# Patient Record
Sex: MALE | Race: WHITE | NOT HISPANIC OR LATINO | Employment: FULL TIME | URBAN - METROPOLITAN AREA
[De-identification: names, ages, dates, MRNs, and addresses within clinical notes are randomized per-mention and may not be internally consistent; named-entity substitution may affect disease eponyms.]

---
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
## 2023-04-07 ENCOUNTER — HOSPITAL ENCOUNTER (EMERGENCY)
Facility: HOSPITAL | Age: 18
Discharge: HOME | End: 2023-04-07
Attending: EMERGENCY MEDICINE

## 2023-04-07 ENCOUNTER — PATIENT MESSAGE (OUTPATIENT)
Dept: FAMILY MEDICINE | Facility: CLINIC | Age: 18
End: 2023-04-07

## 2023-04-07 VITALS
WEIGHT: 190 LBS | DIASTOLIC BLOOD PRESSURE: 80 MMHG | BODY MASS INDEX: 25.73 KG/M2 | TEMPERATURE: 98 F | HEART RATE: 72 BPM | SYSTOLIC BLOOD PRESSURE: 134 MMHG | HEIGHT: 72 IN | RESPIRATION RATE: 14 BRPM | OXYGEN SATURATION: 99 %

## 2023-04-07 DIAGNOSIS — R11.2 NAUSEA AND VOMITING, UNSPECIFIED VOMITING TYPE: Primary | ICD-10-CM

## 2023-04-07 DIAGNOSIS — R10.11 RIGHT UPPER QUADRANT ABDOMINAL PAIN: ICD-10-CM

## 2023-04-07 DIAGNOSIS — K92.0 HEMATEMESIS: ICD-10-CM

## 2023-04-07 PROCEDURE — 71046 X-RAY EXAM CHEST 2 VIEWS: CPT | Mod: TC

## 2023-04-07 PROCEDURE — 71046 XR CHEST PA AND LATERAL: ICD-10-PCS | Mod: 26,,, | Performed by: RADIOLOGY

## 2023-04-07 PROCEDURE — 99283 EMERGENCY DEPT VISIT LOW MDM: CPT | Mod: 25

## 2023-04-07 PROCEDURE — 71046 X-RAY EXAM CHEST 2 VIEWS: CPT | Mod: 26,,, | Performed by: RADIOLOGY

## 2023-04-07 RX ORDER — ONDANSETRON 4 MG/1
4 TABLET, ORALLY DISINTEGRATING ORAL EVERY 6 HOURS PRN
Qty: 20 TABLET | Refills: 0 | Status: SHIPPED | OUTPATIENT
Start: 2023-04-07

## 2023-04-07 NOTE — ED PROVIDER NOTES
Encounter Date: 4/7/2023       History     Chief Complaint   Patient presents with    Emesis     17-year-old male, here from home with his mother for evaluation and treatment of vomiting hematemesis.  Patient states that he became nauseous late last night, and has vomited 3 times overnight.  Early this morning his grandmother gave him 2 Zofran tablets, and he is asymptomatic at this time.  He is concerned because with 1 episode of vomiting had some streaks of blood in the vomitus.  He states now the feels like his throat is tight.  Denies any difficulty swallowing or breathing.  Patient has had similar episodes vomiting in the past.  Ultrasound of the upper abdomen at that time revealed fatty infiltration of the liver but no other significant findings.  Patient states he was advised to follow a low carb and low sugar diet, cut out sodas, etc., and he states for quite some time the dietary change helped with his symptoms.  Last night was the 1st time he had vomited for quite some time.  He is currently not on any medications for nausea, no medications for GERD etc..  Patient does have a photograph on his phone of his vomitus, which does show  a small bit of blood mixed with his vomitus.  Patient denies any abdominal pain.  Denies any dark or bloody stools.  Patient has not seen any specialists for any of the symptoms.  He sees Dr. Dillon for primary care.    Review of patient's allergies indicates:  No Known Allergies  No past medical history on file.  No past surgical history on file.  Family History   Problem Relation Age of Onset    Anxiety disorder Mother     COPD Father     Emphysema Father     Depression Maternal Grandmother     Diabetes Maternal Grandmother     Diabetes Maternal Grandfather     Kidney disease Paternal Grandmother      Social History     Tobacco Use    Smoking status: Never    Smokeless tobacco: Never   Substance Use Topics    Alcohol use: Never    Drug use: Yes     Frequency: 3.0 times  per week     Types: Marijuana     Review of Systems   HENT: Negative.     Eyes: Negative.    Respiratory: Negative.     Cardiovascular: Negative.    Gastrointestinal:  Positive for nausea and vomiting. Negative for abdominal distention, abdominal pain, blood in stool, constipation and diarrhea.   Endocrine: Negative.    Genitourinary: Negative.    Musculoskeletal: Negative.    Allergic/Immunologic: Negative.    Neurological: Negative.    Hematological: Negative.    Psychiatric/Behavioral: Negative.       Physical Exam     Initial Vitals [04/07/23 0656]   BP Pulse Resp Temp SpO2   137/84 70 16 98.2 °F (36.8 °C) 99 %      MAP       --         Physical Exam    Nursing note and vitals reviewed.  Constitutional: He appears well-developed and well-nourished. He is not diaphoretic. No distress.   HENT:   Head: Normocephalic and atraumatic.   Nose: Nose normal.   Mouth/Throat: Oropharynx is clear and moist. No oropharyngeal exudate.   Eyes: Conjunctivae and EOM are normal. Pupils are equal, round, and reactive to light. No scleral icterus.   Neck: Neck supple. No JVD present.   Normal range of motion.  Cardiovascular:  Normal rate, regular rhythm, normal heart sounds and intact distal pulses.           No murmur heard.  Pulmonary/Chest: Breath sounds normal. No stridor. No respiratory distress. He has no wheezes.   Abdominal: Abdomen is soft. Bowel sounds are normal. He exhibits no distension and no mass. There is no abdominal tenderness. There is no rebound and no guarding.   Musculoskeletal:         General: No tenderness or edema. Normal range of motion.      Cervical back: Normal range of motion and neck supple.     Neurological: He is alert and oriented to person, place, and time. He has normal strength. No cranial nerve deficit or sensory deficit. GCS score is 15. GCS eye subscore is 4. GCS verbal subscore is 5. GCS motor subscore is 6.   Skin: Skin is warm and dry. Capillary refill takes less than 2 seconds. No rash  noted. No erythema.   Psychiatric: He has a normal mood and affect. His behavior is normal.       ED Course   Procedures  Labs Reviewed - No data to display       Imaging Results              X-Ray Chest PA And Lateral (Final result)  Result time 04/07/23 08:39:10      Final result by Holger De La Cruz MD (04/07/23 08:39:10)                   Impression:      No evidence of an acute radiographic abnormality.      Electronically signed by: Holger De La Cruz  Date:    04/07/2023  Time:    08:39               Narrative:    EXAMINATION:  XR CHEST PA AND LATERAL    CLINICAL HISTORY:  Hematemesis    TECHNIQUE:  PA and lateral views of the chest were performed.    COMPARISON:  None.    FINDINGS:  Cardiomediastinal silhouette is within normal limits.   Lungs are symmetrically expanded and appear grossly clear.  No definite consolidation, pleural effusion, or pneumothorax. No acute osseous abnormality.                                    X-Rays:   Independently Interpreted Readings:   Other Readings:  Chest x-ray personally reviewed by me shows clear lungs bilaterally.  No free air beneath the diaphragm, normal cardiac silhouette, normal mediastinum, normal skeletal structures  Medications - No data to display  Medical Decision Making:   Differential Diagnosis:   GERD, gastritis, pancreatitis, cholecystitis, bowel obstruction, peptic ulcer, Mag-Edwards tear, Boerhaave's, etc.  ED Management:  Patient had artery taken Zofran prior to coming here and states that his symptoms had dissipated before arrival.  He is had no further episodes while here.  No abdominal pain.  An x-ray showed no mediastinal widening, clear lungs, normal cardiac silhouette, no free air beneath the diaphragm.  I believe patient is safe for discharge home.  Will prescribe Zofran to use as needed, and I recommended daily Pepcid until he sees his primary care provider early next week.  He will return here for any worsening signs or symptoms over the weekend.  He will  continue his diet.                        Clinical Impression:   Final diagnoses:  [K92.0] Hematemesis  [R11.2] Nausea and vomiting, unspecified vomiting type (Primary)        ED Disposition Condition    Discharge Stable          ED Prescriptions       Medication Sig Dispense Start Date End Date Auth. Provider    ondansetron (ZOFRAN-ODT) 4 MG TbDL Take 1 tablet (4 mg total) by mouth every 6 (six) hours as needed. 20 tablet 4/7/2023 -- Eric Sanchez MD          Follow-up Information       Follow up With Specialties Details Why Contact Info    Junaid Dillon MD Family Medicine Call today  [address] [facility] [address] [phone]      [facility] Emergency Dept Emergency Medicine  As needed, If symptoms worsen [address] [facility] [address] [phone]             Eric Sanchez MD  04/07/23 0906

## 2023-04-07 NOTE — ED TRIAGE NOTES
Pt presents to the er with c/o vomiting since last night. Pt states he noted some blood in his vomit. Pt denies abd pain/diarrhea

## 2023-04-07 NOTE — Clinical Note
"John Velasquezdamien Damico was seen and treated in our emergency department on 4/7/2023.  He may return to work on 04/08/2023.       If you have any questions or concerns, please don't hesitate to call.      Kwesi MARSH RN    "

## 2023-04-07 NOTE — DISCHARGE INSTRUCTIONS
As we discussed, continue your diet, avoid any spicy or greasy foods, take a daily acid reduce her such as Pepcid, and take Zofran for any nausea.  Follow-up with Dr. Dillon, who may suggest follow-up with a gastroenterologist or surgeon for endoscopy.  Return here for any worsening signs or symptoms.

## 2023-04-18 ENCOUNTER — OFFICE VISIT (OUTPATIENT)
Dept: FAMILY MEDICINE | Facility: CLINIC | Age: 18
End: 2023-04-18

## 2023-04-18 VITALS
BODY MASS INDEX: 25.14 KG/M2 | HEIGHT: 72 IN | WEIGHT: 185.63 LBS | OXYGEN SATURATION: 98 % | RESPIRATION RATE: 15 BRPM | SYSTOLIC BLOOD PRESSURE: 136 MMHG | HEART RATE: 71 BPM | DIASTOLIC BLOOD PRESSURE: 78 MMHG

## 2023-04-18 DIAGNOSIS — F12.90 MARIJUANA USE: ICD-10-CM

## 2023-04-18 DIAGNOSIS — R11.10 VOMITING, UNSPECIFIED VOMITING TYPE, UNSPECIFIED WHETHER NAUSEA PRESENT: ICD-10-CM

## 2023-04-18 DIAGNOSIS — K21.9 GASTROESOPHAGEAL REFLUX DISEASE, UNSPECIFIED WHETHER ESOPHAGITIS PRESENT: Primary | ICD-10-CM

## 2023-04-18 DIAGNOSIS — F41.9 ANXIETY: ICD-10-CM

## 2023-04-18 DIAGNOSIS — R63.4 WEIGHT LOSS: ICD-10-CM

## 2023-04-18 PROCEDURE — 99214 OFFICE O/P EST MOD 30 MIN: CPT | Mod: PBBFAC,PN | Performed by: STUDENT IN AN ORGANIZED HEALTH CARE EDUCATION/TRAINING PROGRAM

## 2023-04-18 PROCEDURE — 99214 PR OFFICE/OUTPT VISIT, EST, LEVL IV, 30-39 MIN: ICD-10-PCS | Mod: S$PBB,,, | Performed by: STUDENT IN AN ORGANIZED HEALTH CARE EDUCATION/TRAINING PROGRAM

## 2023-04-18 PROCEDURE — 99214 OFFICE O/P EST MOD 30 MIN: CPT | Mod: S$PBB,,, | Performed by: STUDENT IN AN ORGANIZED HEALTH CARE EDUCATION/TRAINING PROGRAM

## 2023-04-18 PROCEDURE — 99999 PR PBB SHADOW E&M-EST. PATIENT-LVL IV: ICD-10-PCS | Mod: PBBFAC,,, | Performed by: STUDENT IN AN ORGANIZED HEALTH CARE EDUCATION/TRAINING PROGRAM

## 2023-04-18 PROCEDURE — 99999 PR PBB SHADOW E&M-EST. PATIENT-LVL IV: CPT | Mod: PBBFAC,,, | Performed by: STUDENT IN AN ORGANIZED HEALTH CARE EDUCATION/TRAINING PROGRAM

## 2023-04-18 RX ORDER — BUSPIRONE HYDROCHLORIDE 5 MG/1
5 TABLET ORAL 3 TIMES DAILY PRN
Qty: 90 TABLET | Refills: 0 | Status: SHIPPED | OUTPATIENT
Start: 2023-04-18 | End: 2023-06-01 | Stop reason: SDUPTHER

## 2023-04-18 RX ORDER — PANTOPRAZOLE SODIUM 40 MG/1
40 TABLET, DELAYED RELEASE ORAL DAILY
Qty: 30 TABLET | Refills: 11 | Status: SHIPPED | OUTPATIENT
Start: 2023-04-18 | End: 2024-04-17

## 2023-04-18 RX ORDER — SERTRALINE HYDROCHLORIDE 25 MG/1
TABLET, FILM COATED ORAL
Qty: 49 TABLET | Refills: 0 | Status: SHIPPED | OUTPATIENT
Start: 2023-04-18 | End: 2023-05-10 | Stop reason: SDUPTHER

## 2023-04-18 NOTE — Clinical Note
I am getting this patient a 4 week followup with you.  I started him on anxiety treatment in case that is playing a part in his vomiting.  He has lost over 50 lbs since July of last year.  I also ordered GI evaluation. Just wanted to give you a heads up for when he follows up.  Have a great day- Alley

## 2023-04-18 NOTE — PATIENT INSTRUCTIONS

## 2023-04-18 NOTE — PROGRESS NOTES
Subjective:       Patient ID: John Damico is a 17 y.o. male.    Chief Complaint: Emesis    ER followup    17-year-old male, here from home with his mother for evaluation and treatment of vomiting hematemesis.  Patient states that he became nauseous late last night, and has vomited 3 times overnight.  Early this morning his grandmother gave him 2 Zofran tablets, and he is asymptomatic at this time.  He is concerned because with 1 episode of vomiting had some streaks of blood in the vomitus.  He states now the feels like his throat is tight.  Denies any difficulty swallowing or breathing.  Patient has had similar episodes vomiting in the past.  Ultrasound of the upper abdomen at that time revealed fatty infiltration of the liver but no other significant findings.  Patient states he was advised to follow a low carb and low sugar diet, cut out sodas, etc., and he states for quite some time the dietary change helped with his symptoms.  Last night was the 1st time he had vomited for quite some time.  He is currently not on any medications for nausea, no medications for GERD etc..  Patient does have a photograph on his phone of his vomitus, which does show  a small bit of blood mixed with his vomitus.  Patient denies any abdominal pain.  Denies any dark or bloody stools.  Patient has not seen any specialists for any of the symptoms.  He sees Dr. Dillon for primary care.    He reports that he has been vomiting every morning over a year (started around July)  He has extreme anxiety, nausea and will vomit in the morning  No specific food associations but notes it is worse if he eats after 8pm  He does have some reflux symptoms. No water brash.  He has had episodes of hematemesis and severe heaving with his vomiting episodes.  He denies dysphagia but says he sometimes has undigested food come back up.  He denies fever.  Some mild abdominal pain after vomiting.  He has lost weight- he reports over 50 lbs in the last  year  He is a marijuana smoker but does not note improvement or worsening with this.  He is worried this is due to his anxiety.  He reports daily constant anxiety symptoms.  He has a family history of anxiety in his mom/dad/grandmother.  He denies depression symptoms    Wt Readings from Last 10 Encounters:  04/18/23 : 84.2 kg (185 lb 9.6 oz) (86 %, Z= 1.06)*  04/07/23 : 86.2 kg (190 lb) (88 %, Z= 1.19)*  10/02/22 : 103 kg (227 lb) (98 %, Z= 2.04)*  09/27/22 : 103.1 kg (227 lb 6.4 oz) (98 %, Z= 2.05)*  08/27/22 : 108.9 kg (240 lb) (99 %, Z= 2.27)*  07/15/22 : 113.4 kg (250 lb) (>99 %, Z= 2.43)*  04/08/22 : 111.9 kg (246 lb 12.8 oz) (>99 %, Z= 2.39)*  09/27/21 : 113.4 kg (250 lb) (>99 %, Z= 2.48)*  01/02/20 : 92.5 kg (204 lb) (98 %, Z= 2.01)*  12/30/19 : 93.7 kg (206 lb 9.1 oz) (98 %, Z= 2.06)*    * Growth percentiles are based on [facility] (Boys, 2-20 Years) data.      Review of Systems   Constitutional:  Positive for fatigue and unexpected weight change. Negative for activity change, appetite change, chills and fever.   HENT:  Negative for trouble swallowing.    Respiratory:  Negative for choking and shortness of breath.    Cardiovascular:  Negative for chest pain and leg swelling.   Gastrointestinal:  Positive for abdominal pain, nausea, vomiting and reflux. Negative for blood in stool, change in bowel habit, constipation, diarrhea and change in bowel habit.   Endocrine: Negative for cold intolerance, heat intolerance, polydipsia and polyuria.   Genitourinary:  Negative for decreased urine volume, difficulty urinating, dysuria, frequency, hematuria and urgency.   Musculoskeletal:  Negative for arthralgias, back pain and gait problem.   Integumentary:  Negative for rash and wound.   Neurological:  Negative for dizziness, weakness and headaches.   Psychiatric/Behavioral:  Negative for dysphoric mood and sleep disturbance. The patient is nervous/anxious.        Objective:      Physical Exam  Constitutional:       General:  He is not in acute distress.     Appearance: Normal appearance. He is not ill-appearing.   Eyes:      Conjunctiva/sclera: Conjunctivae normal.   Cardiovascular:      Rate and Rhythm: Normal rate and regular rhythm.      Heart sounds: Normal heart sounds. No murmur heard.  Pulmonary:      Effort: Pulmonary effort is normal. No respiratory distress.      Breath sounds: Normal breath sounds.   Abdominal:      Palpations: Abdomen is soft.      Tenderness: There is no abdominal tenderness.   Musculoskeletal:      Right lower leg: No edema.      Left lower leg: No edema.   Skin:     General: Skin is warm and dry.   Neurological:      Mental Status: He is alert. Mental status is at baseline.      Gait: Gait normal.   Psychiatric:         Mood and Affect: Mood normal.         Behavior: Behavior normal.         Thought Content: Thought content normal.         Judgment: Judgment normal.       Assessment:       1. Gastroesophageal reflux disease, unspecified whether esophagitis present    2. Vomiting, unspecified vomiting type, unspecified whether nausea present    3. Weight loss    4. Marijuana use    5. Anxiety        Plan:       Problem List Items Addressed This Visit    None  Visit Diagnoses       Gastroesophageal reflux disease, unspecified whether esophagitis present    -  Primary    reports occasional symptoms.  Vomiting is worse if eating after 8pm and usually in the morning. start ppi and GI eval    Relevant Medications    pantoprazole (PROTONIX) 40 MG tablet    Other Relevant Orders    Ambulatory referral/consult to Gastroenterology    Vomiting, unspecified vomiting type, unspecified whether nausea present        GI eval, avoid marijuana use. Treat possible gerd.     Relevant Orders    Ambulatory referral/consult to Gastroenterology    H.Pylori Antibody IgG    Weight loss        Over 60 lbs.  Labs ordered, GI eval- will need egd I suspect    Relevant Orders    Ambulatory referral/consult to Gastroenterology    CBC  Auto Differential    Comprehensive Metabolic Panel    HIV 1/2 Ag/Ab (4th Gen)    Hepatitis C Antibody    Hemoglobin A1C    TSH    Marijuana use        avoid use    Anxiety        Start treatment for anxiety in case this is playing a part. close followup    Relevant Medications    sertraline (ZOLOFT) 25 MG tablet    busPIRone (BUSPAR) 5 MG Tab            Keep close followup   Complex situation with risk to his life as he is rapidly loosing weight for unknown cause.

## 2023-04-22 ENCOUNTER — PATIENT MESSAGE (OUTPATIENT)
Dept: FAMILY MEDICINE | Facility: CLINIC | Age: 18
End: 2023-04-22

## 2023-04-27 ENCOUNTER — LAB VISIT (OUTPATIENT)
Dept: LAB | Facility: HOSPITAL | Age: 18
End: 2023-04-27
Attending: STUDENT IN AN ORGANIZED HEALTH CARE EDUCATION/TRAINING PROGRAM

## 2023-04-27 DIAGNOSIS — R63.4 WEIGHT LOSS: ICD-10-CM

## 2023-04-27 DIAGNOSIS — R11.10 VOMITING, UNSPECIFIED VOMITING TYPE, UNSPECIFIED WHETHER NAUSEA PRESENT: ICD-10-CM

## 2023-04-27 LAB
ALBUMIN SERPL BCP-MCNC: 4.6 G/DL (ref 3.5–5.2)
ALP SERPL-CCNC: 68 U/L (ref 55–135)
ALT SERPL W/O P-5'-P-CCNC: 26 U/L (ref 10–44)
ANION GAP SERPL CALC-SCNC: 9 MMOL/L (ref 8–16)
AST SERPL-CCNC: 20 U/L (ref 10–40)
BASOPHILS # BLD AUTO: 0.04 K/UL (ref 0–0.2)
BASOPHILS NFR BLD: 0.7 % (ref 0–1.9)
BILIRUB SERPL-MCNC: 0.6 MG/DL (ref 0.1–1)
BUN SERPL-MCNC: 10 MG/DL (ref 6–20)
CALCIUM SERPL-MCNC: 9.9 MG/DL (ref 8.7–10.5)
CHLORIDE SERPL-SCNC: 104 MMOL/L (ref 95–110)
CO2 SERPL-SCNC: 27 MMOL/L (ref 23–29)
CREAT SERPL-MCNC: 1 MG/DL (ref 0.5–1.4)
DIFFERENTIAL METHOD: NORMAL
EOSINOPHIL # BLD AUTO: 0.3 K/UL (ref 0–0.5)
EOSINOPHIL NFR BLD: 5 % (ref 0–8)
ERYTHROCYTE [DISTWIDTH] IN BLOOD BY AUTOMATED COUNT: 13.1 % (ref 11.5–14.5)
EST. GFR  (NO RACE VARIABLE): >60 ML/MIN/1.73 M^2
ESTIMATED AVG GLUCOSE: 97 MG/DL (ref 68–131)
GLUCOSE SERPL-MCNC: 90 MG/DL (ref 70–110)
HBA1C MFR BLD: 5 % (ref 4–5.6)
HCT VFR BLD AUTO: 45.8 % (ref 40–54)
HCV AB SERPL QL IA: NORMAL
HGB BLD-MCNC: 15.7 G/DL (ref 14–18)
HIV 1+2 AB+HIV1 P24 AG SERPL QL IA: NORMAL
IMM GRANULOCYTES # BLD AUTO: 0.01 K/UL (ref 0–0.04)
IMM GRANULOCYTES NFR BLD AUTO: 0.2 % (ref 0–0.5)
LYMPHOCYTES # BLD AUTO: 2.4 K/UL (ref 1–4.8)
LYMPHOCYTES NFR BLD: 43.4 % (ref 18–48)
MCH RBC QN AUTO: 28.8 PG (ref 27–31)
MCHC RBC AUTO-ENTMCNC: 34.3 G/DL (ref 32–36)
MCV RBC AUTO: 84 FL (ref 82–98)
MONOCYTES # BLD AUTO: 0.3 K/UL (ref 0.3–1)
MONOCYTES NFR BLD: 5.4 % (ref 4–15)
NEUTROPHILS # BLD AUTO: 2.5 K/UL (ref 1.8–7.7)
NEUTROPHILS NFR BLD: 45.3 % (ref 38–73)
NRBC BLD-RTO: 0 /100 WBC
PLATELET # BLD AUTO: 359 K/UL (ref 150–450)
PMV BLD AUTO: 11.2 FL (ref 9.2–12.9)
POTASSIUM SERPL-SCNC: 3.9 MMOL/L (ref 3.5–5.1)
PROT SERPL-MCNC: 8 G/DL (ref 6–8.4)
RBC # BLD AUTO: 5.45 M/UL (ref 4.6–6.2)
SODIUM SERPL-SCNC: 140 MMOL/L (ref 136–145)
TSH SERPL DL<=0.005 MIU/L-ACNC: 1.63 UIU/ML (ref 0.4–4)
WBC # BLD AUTO: 5.6 K/UL (ref 3.9–12.7)

## 2023-04-27 PROCEDURE — 85025 COMPLETE CBC W/AUTO DIFF WBC: CPT | Performed by: STUDENT IN AN ORGANIZED HEALTH CARE EDUCATION/TRAINING PROGRAM

## 2023-04-27 PROCEDURE — 87389 HIV-1 AG W/HIV-1&-2 AB AG IA: CPT | Performed by: STUDENT IN AN ORGANIZED HEALTH CARE EDUCATION/TRAINING PROGRAM

## 2023-04-27 PROCEDURE — 84443 ASSAY THYROID STIM HORMONE: CPT | Performed by: STUDENT IN AN ORGANIZED HEALTH CARE EDUCATION/TRAINING PROGRAM

## 2023-04-27 PROCEDURE — 83036 HEMOGLOBIN GLYCOSYLATED A1C: CPT | Performed by: STUDENT IN AN ORGANIZED HEALTH CARE EDUCATION/TRAINING PROGRAM

## 2023-04-27 PROCEDURE — 80053 COMPREHEN METABOLIC PANEL: CPT | Performed by: STUDENT IN AN ORGANIZED HEALTH CARE EDUCATION/TRAINING PROGRAM

## 2023-04-27 PROCEDURE — 86677 HELICOBACTER PYLORI ANTIBODY: CPT | Performed by: STUDENT IN AN ORGANIZED HEALTH CARE EDUCATION/TRAINING PROGRAM

## 2023-04-27 PROCEDURE — 86803 HEPATITIS C AB TEST: CPT | Performed by: STUDENT IN AN ORGANIZED HEALTH CARE EDUCATION/TRAINING PROGRAM

## 2023-04-27 PROCEDURE — 36415 COLL VENOUS BLD VENIPUNCTURE: CPT | Performed by: STUDENT IN AN ORGANIZED HEALTH CARE EDUCATION/TRAINING PROGRAM

## 2023-04-28 LAB — H PYLORI IGG SERPL QL IA: NEGATIVE

## 2023-05-10 DIAGNOSIS — F41.9 ANXIETY: ICD-10-CM

## 2023-05-10 RX ORDER — SERTRALINE HYDROCHLORIDE 100 MG/1
100 TABLET, FILM COATED ORAL DAILY
Qty: 30 TABLET | Refills: 2 | Status: SHIPPED | OUTPATIENT
Start: 2023-05-10 | End: 2023-05-17

## 2023-05-17 ENCOUNTER — OFFICE VISIT (OUTPATIENT)
Dept: FAMILY MEDICINE | Facility: CLINIC | Age: 18
End: 2023-05-17

## 2023-05-17 VITALS
WEIGHT: 183.38 LBS | SYSTOLIC BLOOD PRESSURE: 116 MMHG | HEART RATE: 90 BPM | DIASTOLIC BLOOD PRESSURE: 64 MMHG | TEMPERATURE: 98 F | RESPIRATION RATE: 14 BRPM | BODY MASS INDEX: 24.84 KG/M2 | OXYGEN SATURATION: 98 % | HEIGHT: 72 IN

## 2023-05-17 DIAGNOSIS — F41.9 ANXIETY: ICD-10-CM

## 2023-05-17 PROCEDURE — 99214 OFFICE O/P EST MOD 30 MIN: CPT | Mod: S$PBB,,, | Performed by: FAMILY MEDICINE

## 2023-05-17 PROCEDURE — 99214 PR OFFICE/OUTPT VISIT, EST, LEVL IV, 30-39 MIN: ICD-10-PCS | Mod: S$PBB,,, | Performed by: FAMILY MEDICINE

## 2023-05-17 PROCEDURE — 99213 OFFICE O/P EST LOW 20 MIN: CPT | Mod: PBBFAC | Performed by: FAMILY MEDICINE

## 2023-05-17 PROCEDURE — 99999 PR PBB SHADOW E&M-EST. PATIENT-LVL III: CPT | Mod: PBBFAC,,, | Performed by: FAMILY MEDICINE

## 2023-05-17 PROCEDURE — 99999 PR PBB SHADOW E&M-EST. PATIENT-LVL III: ICD-10-PCS | Mod: PBBFAC,,, | Performed by: FAMILY MEDICINE

## 2023-05-17 RX ORDER — SERTRALINE HYDROCHLORIDE 100 MG/1
50 TABLET, FILM COATED ORAL DAILY
Qty: 15 TABLET | Refills: 2 | Status: SHIPPED | OUTPATIENT
Start: 2023-05-17 | End: 2023-06-01

## 2023-05-17 NOTE — PROGRESS NOTES
[facility] - Clinic Note    Subjective      Mr. Damico is a 17 y.o. male who presents to clinic for Follow-up (4wk follow up)    Patient had some nausea and vomiting.  This has resolved since taking the Protonix and the Zoloft.  Zoloft is working well initially but since he increase the dose he has not been feeling right.    PMH John has no past medical history on file.   PSXH John has no past surgical history on file.    John's family history includes Anxiety disorder in his mother; COPD in his father; Depression in his maternal grandmother; Diabetes in his maternal grandfather and maternal grandmother; Emphysema in his father; Kidney disease in his paternal grandmother; Mental illness in his mother.   SH John reports that he has never smoked. He has never used smokeless tobacco. He reports current drug use. Frequency: 3.00 times per week. Drug: Marijuana. He reports that he does not drink alcohol.   ALG John has No Known Allergies.   MED John has a current medication list which includes the following prescription(s): buspirone, loratadine, ondansetron, pantoprazole, and sertraline.     Review of Systems   Constitutional:  Negative for chills and fever.   Respiratory:  Negative for shortness of breath.    Cardiovascular:  Negative for chest pain.   Gastrointestinal:  Negative for abdominal pain, nausea and vomiting.   Neurological:  Negative for dizziness.   Objective     /64 (BP Location: Right arm, Patient Position: Sitting, BP Method: Large (Manual))   Pulse 90   Temp 98.3 °F (36.8 °C) (Temporal)   Resp 14   Ht 6' (1.829 m)   Wt 83.2 kg (183 lb 6.4 oz)   SpO2 98%   BMI 24.87 kg/m²     Physical Exam  Vitals and nursing note reviewed.   Constitutional:       General: He is not in acute distress.     Appearance: Normal appearance. He is well-developed. He is not diaphoretic.   HENT:      Head: Normocephalic and atraumatic.      Right Ear: External ear normal.      Left Ear:  External ear normal.   Eyes:      General:         Right eye: No discharge.         Left eye: No discharge.   Cardiovascular:      Rate and Rhythm: Normal rate and regular rhythm.      Heart sounds: Normal heart sounds.   Pulmonary:      Effort: Pulmonary effort is normal.      Breath sounds: Normal breath sounds. No wheezing or rales.   Skin:     General: Skin is warm and dry.   Neurological:      Mental Status: He is alert and oriented to person, place, and time. Mental status is at baseline.   Psychiatric:         Mood and Affect: Mood normal.         Behavior: Behavior normal.         Thought Content: Thought content normal.         Judgment: Judgment normal.      Assessment/Plan     1. Anxiety  sertraline (ZOLOFT) 100 MG tablet    Start treatment for anxiety in case this is playing a part. close followup        Symptoms have improved.  Wrote letter to cleared to go back to work.  Decrease Zoloft to 50 mg daily.  Continue Protonix for 1 more month then taper off.  Follow-up in 3 months.    Future Appointments   Date Time Provider Department Center   8/14/2023  2:00 PM Junaid Dillon MD [facility]         Junaid Dillon MD  Family Medicine  [facility]

## 2023-05-17 NOTE — LETTER
May 17, 2023      [facility]  [address] [place] [address] [place] [address]  Phone: [phone]  Fax: [phone]       Patient: John Damico   YOB: 2005  Date of Visit: 05/17/2023    To Whom It May Concern:    Keila Damico  was at [facility] on 05/17/2023.  He had missed work from 04/18/23 to now due to vomiting. The patient may return to work/school on 5/19/23 with no restrictions. If you have any questions or concerns, or if I can be of further assistance, please do not hesitate to contact me.    Sincerely,        Junaid Dillon MD

## 2023-05-25 ENCOUNTER — PATIENT MESSAGE (OUTPATIENT)
Dept: FAMILY MEDICINE | Facility: CLINIC | Age: 18
End: 2023-05-25

## 2023-06-01 ENCOUNTER — OFFICE VISIT (OUTPATIENT)
Dept: FAMILY MEDICINE | Facility: CLINIC | Age: 18
End: 2023-06-01

## 2023-06-01 VITALS
SYSTOLIC BLOOD PRESSURE: 110 MMHG | RESPIRATION RATE: 16 BRPM | HEIGHT: 72 IN | BODY MASS INDEX: 24.7 KG/M2 | WEIGHT: 182.38 LBS | HEART RATE: 79 BPM | DIASTOLIC BLOOD PRESSURE: 64 MMHG | OXYGEN SATURATION: 95 % | TEMPERATURE: 98 F

## 2023-06-01 DIAGNOSIS — F41.9 ANXIETY: Primary | ICD-10-CM

## 2023-06-01 DIAGNOSIS — F41.9 ANXIETY: ICD-10-CM

## 2023-06-01 PROCEDURE — 99214 OFFICE O/P EST MOD 30 MIN: CPT | Mod: PBBFAC | Performed by: FAMILY MEDICINE

## 2023-06-01 PROCEDURE — 99999 PR PBB SHADOW E&M-EST. PATIENT-LVL IV: ICD-10-PCS | Mod: PBBFAC,,, | Performed by: FAMILY MEDICINE

## 2023-06-01 PROCEDURE — 99213 OFFICE O/P EST LOW 20 MIN: CPT | Mod: S$PBB,,, | Performed by: FAMILY MEDICINE

## 2023-06-01 PROCEDURE — 99213 PR OFFICE/OUTPT VISIT, EST, LEVL III, 20-29 MIN: ICD-10-PCS | Mod: S$PBB,,, | Performed by: FAMILY MEDICINE

## 2023-06-01 PROCEDURE — 99999 PR PBB SHADOW E&M-EST. PATIENT-LVL IV: CPT | Mod: PBBFAC,,, | Performed by: FAMILY MEDICINE

## 2023-06-01 RX ORDER — BUSPIRONE HYDROCHLORIDE 5 MG/1
5 TABLET ORAL 3 TIMES DAILY PRN
Qty: 90 TABLET | Refills: 0 | Status: SHIPPED | OUTPATIENT
Start: 2023-06-01 | End: 2023-06-06 | Stop reason: SDUPTHER

## 2023-06-01 NOTE — PROGRESS NOTES
[facility] - Clinic Note    Subjective      Mr. Damico is a 17 y.o. male who presents to clinic for Follow-up (Discuss medications)    Patient started to have some suicidal thoughts when taking the Zoloft.  Stopped taking this and the thoughts have improved.  He has no current plan or intention.    PMDELMAR Lopez has no past medical history on file.   PSXH John has no past surgical history on file.   FH John's family history includes Anxiety disorder in his mother; COPD in his father; Depression in his maternal grandmother; Diabetes in his maternal grandfather and maternal grandmother; Emphysema in his father; Kidney disease in his paternal grandmother; Mental illness in his mother.   SH John reports that he has never smoked. He has never used smokeless tobacco. He reports current drug use. Frequency: 3.00 times per week. Drug: Marijuana. He reports that he does not drink alcohol.   JESENIA Lopez has No Known Allergies.   NADINE Lopez has a current medication list which includes the following prescription(s): loratadine, pantoprazole, buspirone, and ondansetron.     Review of Systems   Constitutional:  Negative for chills and fever.   Respiratory:  Negative for shortness of breath.    Cardiovascular:  Negative for chest pain.   Gastrointestinal:  Negative for abdominal pain, nausea and vomiting.   Neurological:  Negative for dizziness.   Objective     /64 (BP Location: Right arm, Patient Position: Sitting, BP Method: Large (Manual))   Pulse 79   Temp 98.2 °F (36.8 °C) (Temporal)   Resp 16   Ht 6' (1.829 m)   Wt 82.7 kg (182 lb 6.4 oz)   SpO2 95%   BMI 24.74 kg/m²     Physical Exam  Vitals and nursing note reviewed.   Constitutional:       General: He is not in acute distress.     Appearance: Normal appearance. He is well-developed. He is not diaphoretic.   HENT:      Head: Normocephalic and atraumatic.      Right Ear: External ear normal.      Left Ear: External ear normal.   Eyes:      General:          Right eye: No discharge.         Left eye: No discharge.   Cardiovascular:      Rate and Rhythm: Normal rate and regular rhythm.      Heart sounds: Normal heart sounds.   Pulmonary:      Effort: Pulmonary effort is normal.      Breath sounds: Normal breath sounds. No wheezing or rales.   Skin:     General: Skin is warm and dry.   Neurological:      Mental Status: He is alert and oriented to person, place, and time. Mental status is at baseline.   Psychiatric:         Mood and Affect: Mood normal.         Behavior: Behavior normal.         Thought Content: Thought content normal.         Judgment: Judgment normal.      Assessment/Plan     1. Anxiety  busPIRone (BUSPAR) 5 MG Tab      2. Anxiety  busPIRone (BUSPAR) 5 MG Tab    Start treatment for anxiety in case this is playing a part. close followup        Will restart BuSpar.  Will have patient follow-up in 1 week for re-evaluation.    Future Appointments   Date Time Provider Department Center   6/6/2023 10:40 AM Junaid Dillon MD [facility] MICKEY Mcgarry   8/14/2023  2:00 PM Junaid Dillon MD [facility] MICKEY Dillon MD  Family Medicine  [facility]

## 2023-06-06 ENCOUNTER — OFFICE VISIT (OUTPATIENT)
Dept: FAMILY MEDICINE | Facility: CLINIC | Age: 18
End: 2023-06-06

## 2023-06-06 VITALS
TEMPERATURE: 98 F | DIASTOLIC BLOOD PRESSURE: 70 MMHG | OXYGEN SATURATION: 98 % | HEART RATE: 73 BPM | SYSTOLIC BLOOD PRESSURE: 122 MMHG | RESPIRATION RATE: 16 BRPM | BODY MASS INDEX: 24.79 KG/M2 | WEIGHT: 183 LBS | HEIGHT: 72 IN

## 2023-06-06 DIAGNOSIS — F41.9 ANXIETY: ICD-10-CM

## 2023-06-06 PROCEDURE — 99999 PR PBB SHADOW E&M-EST. PATIENT-LVL III: CPT | Mod: PBBFAC,,, | Performed by: FAMILY MEDICINE

## 2023-06-06 PROCEDURE — 99214 PR OFFICE/OUTPT VISIT, EST, LEVL IV, 30-39 MIN: ICD-10-PCS | Mod: S$PBB,,, | Performed by: FAMILY MEDICINE

## 2023-06-06 PROCEDURE — 99213 OFFICE O/P EST LOW 20 MIN: CPT | Mod: PBBFAC | Performed by: FAMILY MEDICINE

## 2023-06-06 PROCEDURE — 99214 OFFICE O/P EST MOD 30 MIN: CPT | Mod: S$PBB,,, | Performed by: FAMILY MEDICINE

## 2023-06-06 PROCEDURE — 99999 PR PBB SHADOW E&M-EST. PATIENT-LVL III: ICD-10-PCS | Mod: PBBFAC,,, | Performed by: FAMILY MEDICINE

## 2023-06-06 RX ORDER — BUSPIRONE HYDROCHLORIDE 10 MG/1
10 TABLET ORAL 3 TIMES DAILY PRN
Qty: 90 TABLET | Refills: 0 | Status: SHIPPED | OUTPATIENT
Start: 2023-06-06 | End: 2024-06-05

## 2023-06-06 NOTE — PROGRESS NOTES
[facility] - Clinic Note    Subjective      Mr. Damico is a 17 y.o. male who presents to clinic for Follow-up (1wk follow up)    Patient started to have some suicidal thoughts when taking the Zoloft.  Stopped taking this and the thoughts have improved.  He has no current plan or intention.    Interval history:  The BuSpar does not seem to be helping but he is no longer having any suicidal thoughts.  Started seeing [facility] yesterday.  Has follow-up appointments on the 19th and 21st.    PMH John has no past medical history on file.   PSXH John has no past surgical history on file.    John's family history includes Anxiety disorder in his mother; COPD in his father; Depression in his maternal grandmother; Diabetes in his maternal grandfather and maternal grandmother; Emphysema in his father; Kidney disease in his paternal grandmother; Mental illness in his mother.   KAYLYNN Lopez reports that he has never smoked. He has never used smokeless tobacco. He reports current drug use. Frequency: 3.00 times per week. Drug: Marijuana. He reports that he does not drink alcohol.   JESENIA Lopez has No Known Allergies.   NADINE Lopez has a current medication list which includes the following prescription(s): loratadine, pantoprazole, buspirone, and ondansetron.     Review of Systems   Constitutional:  Negative for chills and fever.   Respiratory:  Negative for shortness of breath.    Cardiovascular:  Negative for chest pain.   Gastrointestinal:  Negative for abdominal pain, nausea and vomiting.   Neurological:  Negative for dizziness.   Objective     /70 (BP Location: Left arm, Patient Position: Sitting, BP Method: Large (Manual))   Pulse 73   Temp 97.8 °F (36.6 °C) (Temporal)   Resp 16   Ht 6' (1.829 m)   Wt 83 kg (183 lb)   SpO2 98%   BMI 24.82 kg/m²     Physical Exam  Vitals and nursing note reviewed.   Constitutional:       General: He is not in acute distress.     Appearance: Normal  appearance. He is well-developed. He is not diaphoretic.   HENT:      Head: Normocephalic and atraumatic.      Right Ear: External ear normal.      Left Ear: External ear normal.   Eyes:      General:         Right eye: No discharge.         Left eye: No discharge.   Cardiovascular:      Rate and Rhythm: Normal rate and regular rhythm.      Heart sounds: Normal heart sounds.   Pulmonary:      Effort: Pulmonary effort is normal.      Breath sounds: Normal breath sounds. No wheezing or rales.   Skin:     General: Skin is warm and dry.   Neurological:      Mental Status: He is alert and oriented to person, place, and time. Mental status is at baseline.   Psychiatric:         Mood and Affect: Mood normal.         Behavior: Behavior normal.         Thought Content: Thought content normal.         Judgment: Judgment normal.      Assessment/Plan     1. Anxiety  busPIRone (BUSPAR) 10 MG tablet    Start treatment for anxiety in case this is playing a part. close followup        Will try increasing BuSpar to 10 mg 3 times a day as needed.  Keep follow-up with [facility].    Future Appointments   Date Time Provider Department Center   8/14/2023  2:00 PM [facility]id Dillon [facility]         Junaid Dillon MD  Family Medicine  [facility]